# Patient Record
Sex: FEMALE | Race: WHITE | NOT HISPANIC OR LATINO | URBAN - METROPOLITAN AREA
[De-identification: names, ages, dates, MRNs, and addresses within clinical notes are randomized per-mention and may not be internally consistent; named-entity substitution may affect disease eponyms.]

---

## 2022-12-23 ENCOUNTER — OFFICE VISIT (OUTPATIENT)
Dept: URBAN - METROPOLITAN AREA CLINIC 54 | Facility: CLINIC | Age: 87
End: 2022-12-23

## 2023-08-29 ENCOUNTER — WEB ENCOUNTER (OUTPATIENT)
Dept: URBAN - NONMETROPOLITAN AREA CLINIC 4 | Facility: CLINIC | Age: 88
End: 2023-08-29

## 2023-08-29 ENCOUNTER — OFFICE VISIT (OUTPATIENT)
Dept: URBAN - NONMETROPOLITAN AREA CLINIC 4 | Facility: CLINIC | Age: 88
End: 2023-08-29
Payer: MEDICARE

## 2023-08-29 VITALS
TEMPERATURE: 97.3 F | DIASTOLIC BLOOD PRESSURE: 71 MMHG | BODY MASS INDEX: 23.32 KG/M2 | HEIGHT: 60 IN | SYSTOLIC BLOOD PRESSURE: 155 MMHG | HEART RATE: 63 BPM | WEIGHT: 118.8 LBS

## 2023-08-29 DIAGNOSIS — R14.0 ABDOMINAL DISTENTION: ICD-10-CM

## 2023-08-29 PROCEDURE — 99204 OFFICE O/P NEW MOD 45 MIN: CPT | Performed by: PHYSICIAN ASSISTANT

## 2023-08-29 PROCEDURE — 99244 OFF/OP CNSLTJ NEW/EST MOD 40: CPT | Performed by: PHYSICIAN ASSISTANT

## 2023-08-29 RX ORDER — ASPIRIN 81 MG/1
1 TABLET TABLET, CHEWABLE ORAL ONCE A DAY
Status: ACTIVE | COMMUNITY

## 2023-08-29 RX ORDER — LEVOTHYROXINE SODIUM 100 UG/1
TABLET ORAL
Qty: 28 TABLET | Status: ACTIVE | COMMUNITY

## 2023-08-29 RX ORDER — METHENAMINE HIPPURATE 1000 MG/1
1/2 TABLET TABLET ORAL TWICE A DAY
Status: ACTIVE | COMMUNITY

## 2023-08-29 RX ORDER — ALBUTEROL SULFATE 90 UG/1
AEROSOL, METERED RESPIRATORY (INHALATION)
Qty: 0 | Refills: 0 | Status: ON HOLD | COMMUNITY
Start: 1900-01-01

## 2023-08-29 RX ORDER — ONDANSETRON HYDROCHLORIDE 4 MG/1
1 TABLET TABLET, FILM COATED ORAL ONCE A DAY
Qty: 3 TABLET | Status: ACTIVE | COMMUNITY

## 2023-08-29 RX ORDER — HYDROCODONE BITARTRATE AND ACETAMINOPHEN 5; 325 MG/1; MG/1
1 TABLET AS NEEDED FOR PAIN TABLET ORAL
Qty: 20 TABLET | Refills: 0 | Status: ACTIVE | COMMUNITY

## 2023-08-29 RX ORDER — FAMOTIDINE 40 MG/1
1 TABLET AT BEDTIME TABLET ORAL ONCE A DAY
Qty: 28 TABLET | Status: ACTIVE | COMMUNITY

## 2023-08-29 RX ORDER — BUDESONIDE 3 MG/1
CAPSULE, COATED PELLETS ORAL
Qty: 0 | Refills: 0 | Status: ON HOLD | COMMUNITY
Start: 1900-01-01

## 2023-08-29 RX ORDER — HYDRALAZINE HYDROCHLORIDE 50 MG/1
1 TABLET WITH FOOD TABLET ORAL TWICE A DAY
Qty: 56 TABLET | Status: ACTIVE | COMMUNITY

## 2023-08-29 RX ORDER — LOSARTAN POTASSIUM 100 MG/1
1 TABLET TABLET, FILM COATED ORAL ONCE A DAY
Qty: 28 TABLET | Status: ACTIVE | COMMUNITY

## 2023-08-29 RX ORDER — HYOSCYAMINE SULFATE 0.12 MG/1
1 TABLET AS NEEDED TABLET ORAL AT BEDTIME
Status: ACTIVE | COMMUNITY

## 2023-08-29 RX ORDER — FUROSEMIDE 40 MG/1
1 TABLET TABLET ORAL ONCE A DAY
Qty: 28 TABLET | Status: ACTIVE | COMMUNITY

## 2023-08-29 RX ORDER — AMLODIPINE BESYLATE 10 MG/1
1 TABLET TABLET ORAL ONCE A DAY
Qty: 28 TABLET | Status: ACTIVE | COMMUNITY

## 2023-08-29 RX ORDER — PRAVASTATIN SODIUM 40 MG/1
TABLET ORAL
Qty: 0 | Refills: 0 | Status: ON HOLD | COMMUNITY
Start: 1900-01-01

## 2023-08-29 RX ORDER — DENOSUMAB 60 MG/ML
AS DIRECTED INJECTION SUBCUTANEOUS
Status: ACTIVE | COMMUNITY

## 2023-08-29 RX ORDER — POLYETHYLENE GLYCOL 3350 17 G
1 PACKET MIXED WITH 8 OUNCES OF FLUID POWDER IN PACKET (EA) ORAL ONCE A DAY
Qty: 30 | OUTPATIENT
Start: 2023-08-29 | End: 2023-09-28

## 2023-08-29 NOTE — HPI-TODAY'S VISIT:
89-year-old female resident of an assisted living facility with multiple comorbidities including chronic diastolic CHF, hypothyroidism, hypertension, neurogenic bladder with chronic indwelling Gonzalez catheter and tremor disorder presented here today for follow up on abdominal distention.    Recent at [facility]  to the [place] with DC summary as noted  Patient was managed for dehydration with IV crystalloids leading to an improvement in renal function and fluid status.  Patient also received as needed Xanax which seemed to help with the tremors; however, developed excessive daytime somnolence and as result was taken off his Xanax.   Patient's tremors have since resolved.  Patient is able to participate in physical therapy with minimal assistance and ambulates with the aid of a rollator walker.  Patient is currently hemodynamically stable for discharge back to assisted living facility today.  Case management notified of patient's discharge.  According to son, patient has an appointment with a neurologist on Tuesday 8/29 for further evaluation of episodic tremors which appears to be chronic.  Patient advised to hold off on diuretics for another week until she sees a PCP.   Here for abdomnial bloating.  CT as below: IMPRESSION: Postsurgical changes of the bones. Enlarged cardiac silhouette. Gonzalez catheter within a decompressed bladder please note the Gonzalez catheter is somewhat advanced into the bladder lumen.  Denies constipation, BRBPR or n/v  No weight loss Living in facility- details of HPI are limited

## 2023-08-29 NOTE — PHYSICAL EXAM GASTROINTESTINAL
Abdomen,  soft, nontender, distended,  normal bowel sounds,  Liver and Spleen,  no hepatomegaly present  Non rigid, tympanic to percussion

## 2023-09-26 ENCOUNTER — OFFICE VISIT (OUTPATIENT)
Dept: URBAN - NONMETROPOLITAN AREA CLINIC 4 | Facility: CLINIC | Age: 88
End: 2023-09-26

## 2023-10-04 ENCOUNTER — DASHBOARD ENCOUNTERS (OUTPATIENT)
Age: 88
End: 2023-10-04

## 2023-10-04 ENCOUNTER — OFFICE VISIT (OUTPATIENT)
Dept: URBAN - NONMETROPOLITAN AREA CLINIC 4 | Facility: CLINIC | Age: 88
End: 2023-10-04
Payer: MEDICARE

## 2023-10-04 VITALS
WEIGHT: 121 LBS | TEMPERATURE: 94.3 F | HEIGHT: 60 IN | SYSTOLIC BLOOD PRESSURE: 111 MMHG | HEART RATE: 51 BPM | BODY MASS INDEX: 23.75 KG/M2 | DIASTOLIC BLOOD PRESSURE: 50 MMHG

## 2023-10-04 DIAGNOSIS — R14.0 ABDOMINAL DISTENTION: ICD-10-CM

## 2023-10-04 PROBLEM — 60728008: Status: ACTIVE | Noted: 2023-10-04

## 2023-10-04 PROCEDURE — 99214 OFFICE O/P EST MOD 30 MIN: CPT | Performed by: PHYSICIAN ASSISTANT

## 2023-10-04 RX ORDER — ONDANSETRON HYDROCHLORIDE 4 MG/1
1 TABLET TABLET, FILM COATED ORAL ONCE A DAY
Qty: 3 TABLET | Status: ACTIVE | COMMUNITY

## 2023-10-04 RX ORDER — FAMOTIDINE 40 MG/1
1 TABLET AT BEDTIME TABLET ORAL ONCE A DAY
Qty: 28 TABLET | Status: ACTIVE | COMMUNITY

## 2023-10-04 RX ORDER — FUROSEMIDE 40 MG/1
1 TABLET TABLET ORAL ONCE A DAY
Qty: 28 TABLET | Status: ACTIVE | COMMUNITY

## 2023-10-04 RX ORDER — PRAVASTATIN SODIUM 40 MG/1
TABLET ORAL
Qty: 0 | Refills: 0 | Status: ON HOLD | COMMUNITY
Start: 1900-01-01

## 2023-10-04 RX ORDER — HYDRALAZINE HYDROCHLORIDE 50 MG/1
1 TABLET WITH FOOD TABLET ORAL TWICE A DAY
Qty: 56 TABLET | Status: ACTIVE | COMMUNITY

## 2023-10-04 RX ORDER — LEVOTHYROXINE SODIUM 100 UG/1
TABLET ORAL
Qty: 28 TABLET | Status: ACTIVE | COMMUNITY

## 2023-10-04 RX ORDER — HYOSCYAMINE SULFATE 0.12 MG/1
1 TABLET AS NEEDED TABLET ORAL AT BEDTIME
Status: ACTIVE | COMMUNITY

## 2023-10-04 RX ORDER — METHENAMINE HIPPURATE 1000 MG/1
1/2 TABLET TABLET ORAL TWICE A DAY
Status: ACTIVE | COMMUNITY

## 2023-10-04 RX ORDER — HYDROCODONE BITARTRATE AND ACETAMINOPHEN 5; 325 MG/1; MG/1
1 TABLET AS NEEDED FOR PAIN TABLET ORAL
Qty: 20 TABLET | Refills: 0 | Status: ACTIVE | COMMUNITY

## 2023-10-04 RX ORDER — LOSARTAN POTASSIUM 100 MG/1
1 TABLET TABLET, FILM COATED ORAL ONCE A DAY
Qty: 28 TABLET | Status: ACTIVE | COMMUNITY

## 2023-10-04 RX ORDER — DENOSUMAB 60 MG/ML
AS DIRECTED INJECTION SUBCUTANEOUS
Status: ACTIVE | COMMUNITY

## 2023-10-04 RX ORDER — BUDESONIDE 3 MG/1
CAPSULE, COATED PELLETS ORAL
Qty: 0 | Refills: 0 | Status: ON HOLD | COMMUNITY
Start: 1900-01-01

## 2023-10-04 RX ORDER — ALBUTEROL SULFATE 90 UG/1
AEROSOL, METERED RESPIRATORY (INHALATION)
Qty: 0 | Refills: 0 | Status: ON HOLD | COMMUNITY
Start: 1900-01-01

## 2023-10-04 RX ORDER — AMLODIPINE BESYLATE 10 MG/1
1 TABLET TABLET ORAL ONCE A DAY
Qty: 28 TABLET | Status: ACTIVE | COMMUNITY

## 2023-10-04 RX ORDER — ASPIRIN 81 MG/1
1 TABLET TABLET, CHEWABLE ORAL ONCE A DAY
Status: ACTIVE | COMMUNITY

## 2023-10-04 NOTE — PHYSICAL EXAM GASTROINTESTINAL
Abdomen,  soft, nontender, distended,  normal bowel sounds,  Liver and Spleen,  no hepatomegaly present

## 2023-10-04 NOTE — PHYSICAL EXAM CONSTITUTIONAL:
normal,  alert,  pleasant, well nourished, in no acute distress,  well developed, well nourished,  in wheelchair.  Lethargic, sedated with regards to tremor

## 2023-10-04 NOTE — HPI-TODAY'S VISIT:
89-year-old female resident of an assisted living facility with multiple comorbidities including chronic diastolic CHF, hypothyroidism, hypertension, neurogenic bladder with chronic indwelling Gonzalez catheter and tremor disorder presented here today for follow up on abdominal distention.    Recent at [facility]  to the [place] with DC summary as noted  Patient was managed for dehydration with IV crystalloids leading to an improvement in renal function and fluid status.  Patient also received as needed Xanax which seemed to help with the tremors; however, developed excessive daytime somnolence and as result was taken off his Xanax.   Patient's tremors have since resolved.  Patient is able to participate in physical therapy with minimal assistance and ambulates with the aid of a rollator walker.  Patient is currently hemodynamically stable for discharge back to assisted living facility today.  Case management notified of patient's discharge.  According to son, patient has an appointment with a neurologist on Tuesday 8/29 for further evaluation of episodic tremors which appears to be chronic.  Patient advised to hold off on diuretics for another week until she sees a PCP.   Here for abdomnial bloating.  CT as below: IMPRESSION: Postsurgical changes of the bones. Enlarged cardiac silhouette. Gonzalez catheter within a decompressed bladder please note the Gonzalez catheter is somewhat advanced into the bladder lumen.  Denies constipation, BRBPR or n/v  No weight loss Living in facility- details of HPI are limited  10.4.23 with son at visit today, remains wheelchair bound lethargic, as son states it was a bad day with regards to her tremor, and she needed extra medications.  No longer with constipation, with liquid stool per son Not taking Miralax.  Gonzalez in place.